# Patient Record
Sex: FEMALE | Race: WHITE | URBAN - METROPOLITAN AREA
[De-identification: names, ages, dates, MRNs, and addresses within clinical notes are randomized per-mention and may not be internally consistent; named-entity substitution may affect disease eponyms.]

---

## 2017-05-12 ENCOUNTER — AMBULATORY - HEALTHEAST (OUTPATIENT)
Dept: CARDIAC REHAB | Facility: HOSPITAL | Age: 77
End: 2017-05-12

## 2017-05-12 DIAGNOSIS — Z95.5 STENTED CORONARY ARTERY: ICD-10-CM

## 2017-06-20 ENCOUNTER — AMBULATORY - HEALTHEAST (OUTPATIENT)
Dept: CARDIAC REHAB | Facility: HOSPITAL | Age: 77
End: 2017-06-20

## 2017-06-20 DIAGNOSIS — Z95.5 STENTED CORONARY ARTERY: ICD-10-CM

## 2017-06-23 ENCOUNTER — AMBULATORY - HEALTHEAST (OUTPATIENT)
Dept: CARDIAC REHAB | Facility: HOSPITAL | Age: 77
End: 2017-06-23

## 2017-06-23 DIAGNOSIS — Z95.5 STENTED CORONARY ARTERY: ICD-10-CM

## 2017-06-26 ENCOUNTER — AMBULATORY - HEALTHEAST (OUTPATIENT)
Dept: CARDIAC REHAB | Facility: HOSPITAL | Age: 77
End: 2017-06-26

## 2017-06-26 DIAGNOSIS — Z95.5 STENTED CORONARY ARTERY: ICD-10-CM

## 2017-07-03 ENCOUNTER — AMBULATORY - HEALTHEAST (OUTPATIENT)
Dept: CARDIAC REHAB | Facility: HOSPITAL | Age: 77
End: 2017-07-03

## 2017-07-03 DIAGNOSIS — Z95.5 STENTED CORONARY ARTERY: ICD-10-CM

## 2017-07-07 ENCOUNTER — AMBULATORY - HEALTHEAST (OUTPATIENT)
Dept: CARDIAC REHAB | Facility: HOSPITAL | Age: 77
End: 2017-07-07

## 2017-07-07 DIAGNOSIS — Z95.5 STENTED CORONARY ARTERY: ICD-10-CM

## 2017-07-10 ENCOUNTER — AMBULATORY - HEALTHEAST (OUTPATIENT)
Dept: CARDIAC REHAB | Facility: HOSPITAL | Age: 77
End: 2017-07-10

## 2017-07-10 DIAGNOSIS — Z95.5 STENTED CORONARY ARTERY: ICD-10-CM

## 2017-07-12 ENCOUNTER — AMBULATORY - HEALTHEAST (OUTPATIENT)
Dept: CARDIAC REHAB | Facility: HOSPITAL | Age: 77
End: 2017-07-12

## 2017-07-12 DIAGNOSIS — Z95.5 STENTED CORONARY ARTERY: ICD-10-CM

## 2017-07-21 ENCOUNTER — AMBULATORY - HEALTHEAST (OUTPATIENT)
Dept: CARDIAC REHAB | Facility: HOSPITAL | Age: 77
End: 2017-07-21

## 2017-07-21 DIAGNOSIS — Z95.5 STENTED CORONARY ARTERY: ICD-10-CM

## 2017-07-24 ENCOUNTER — AMBULATORY - HEALTHEAST (OUTPATIENT)
Dept: CARDIAC REHAB | Facility: HOSPITAL | Age: 77
End: 2017-07-24

## 2017-07-24 DIAGNOSIS — Z95.5 STENTED CORONARY ARTERY: ICD-10-CM

## 2017-07-31 ENCOUNTER — AMBULATORY - HEALTHEAST (OUTPATIENT)
Dept: CARDIAC REHAB | Facility: HOSPITAL | Age: 77
End: 2017-07-31

## 2017-07-31 DIAGNOSIS — Z95.5 STENTED CORONARY ARTERY: ICD-10-CM

## 2017-08-07 ENCOUNTER — AMBULATORY - HEALTHEAST (OUTPATIENT)
Dept: CARDIAC REHAB | Facility: HOSPITAL | Age: 77
End: 2017-08-07

## 2017-08-07 DIAGNOSIS — Z95.5 STENTED CORONARY ARTERY: ICD-10-CM

## 2017-12-07 ENCOUNTER — DOCUMENTATION ONLY (OUTPATIENT)
Dept: OTHER | Facility: CLINIC | Age: 77
End: 2017-12-07

## 2017-12-07 PROBLEM — Z71.89 ACP (ADVANCE CARE PLANNING): Chronic | Status: ACTIVE | Noted: 2017-12-07

## 2018-06-06 ENCOUNTER — AMBULATORY - HEALTHEAST (OUTPATIENT)
Dept: CARDIAC REHAB | Facility: HOSPITAL | Age: 78
End: 2018-06-06

## 2018-06-06 DIAGNOSIS — Z95.5 STENTED CORONARY ARTERY: ICD-10-CM

## 2018-06-19 ENCOUNTER — AMBULATORY - HEALTHEAST (OUTPATIENT)
Dept: CARDIAC REHAB | Facility: HOSPITAL | Age: 78
End: 2018-06-19

## 2018-06-19 DIAGNOSIS — I20.89 CHRONIC STABLE ANGINA (H): ICD-10-CM

## 2018-06-19 DIAGNOSIS — Z95.5 STENTED CORONARY ARTERY: ICD-10-CM

## 2018-06-19 ASSESSMENT — MIFFLIN-ST. JEOR: SCORE: 1263.33

## 2018-06-25 ENCOUNTER — AMBULATORY - HEALTHEAST (OUTPATIENT)
Dept: CARDIAC REHAB | Facility: HOSPITAL | Age: 78
End: 2018-06-25

## 2018-06-25 DIAGNOSIS — I20.89 CHRONIC STABLE ANGINA (H): ICD-10-CM

## 2018-06-27 ENCOUNTER — AMBULATORY - HEALTHEAST (OUTPATIENT)
Dept: CARDIAC REHAB | Facility: HOSPITAL | Age: 78
End: 2018-06-27

## 2018-06-27 DIAGNOSIS — I20.89 CHRONIC STABLE ANGINA (H): ICD-10-CM

## 2018-07-30 ENCOUNTER — AMBULATORY - HEALTHEAST (OUTPATIENT)
Dept: CARDIAC REHAB | Facility: HOSPITAL | Age: 78
End: 2018-07-30

## 2018-07-30 DIAGNOSIS — I20.89 CHRONIC STABLE ANGINA (H): ICD-10-CM

## 2018-08-01 ENCOUNTER — AMBULATORY - HEALTHEAST (OUTPATIENT)
Dept: CARDIAC REHAB | Facility: HOSPITAL | Age: 78
End: 2018-08-01

## 2018-08-01 DIAGNOSIS — I20.89 CHRONIC STABLE ANGINA (H): ICD-10-CM

## 2018-08-06 ENCOUNTER — AMBULATORY - HEALTHEAST (OUTPATIENT)
Dept: CARDIAC REHAB | Facility: HOSPITAL | Age: 78
End: 2018-08-06

## 2018-08-06 DIAGNOSIS — I20.89 CHRONIC STABLE ANGINA (H): ICD-10-CM

## 2018-08-08 ENCOUNTER — AMBULATORY - HEALTHEAST (OUTPATIENT)
Dept: CARDIAC REHAB | Facility: HOSPITAL | Age: 78
End: 2018-08-08

## 2018-08-08 DIAGNOSIS — Z95.5 STENTED CORONARY ARTERY: ICD-10-CM

## 2018-08-13 ENCOUNTER — AMBULATORY - HEALTHEAST (OUTPATIENT)
Dept: CARDIAC REHAB | Facility: HOSPITAL | Age: 78
End: 2018-08-13

## 2018-08-13 DIAGNOSIS — I20.89 CHRONIC STABLE ANGINA (H): ICD-10-CM

## 2018-08-15 ENCOUNTER — AMBULATORY - HEALTHEAST (OUTPATIENT)
Dept: CARDIAC REHAB | Facility: HOSPITAL | Age: 78
End: 2018-08-15

## 2018-08-15 DIAGNOSIS — Z95.5 STENTED CORONARY ARTERY: ICD-10-CM

## 2018-08-20 ENCOUNTER — AMBULATORY - HEALTHEAST (OUTPATIENT)
Dept: CARDIAC REHAB | Facility: HOSPITAL | Age: 78
End: 2018-08-20

## 2018-08-20 DIAGNOSIS — I20.89 CHRONIC STABLE ANGINA (H): ICD-10-CM

## 2018-08-22 ENCOUNTER — AMBULATORY - HEALTHEAST (OUTPATIENT)
Dept: CARDIAC REHAB | Facility: HOSPITAL | Age: 78
End: 2018-08-22

## 2018-08-22 DIAGNOSIS — I20.89 CHRONIC STABLE ANGINA (H): ICD-10-CM

## 2018-09-10 ENCOUNTER — AMBULATORY - HEALTHEAST (OUTPATIENT)
Dept: CARDIAC REHAB | Facility: HOSPITAL | Age: 78
End: 2018-09-10

## 2018-09-10 DIAGNOSIS — I20.89 CHRONIC STABLE ANGINA (H): ICD-10-CM

## 2018-09-12 ENCOUNTER — AMBULATORY - HEALTHEAST (OUTPATIENT)
Dept: CARDIAC REHAB | Facility: HOSPITAL | Age: 78
End: 2018-09-12

## 2018-09-12 DIAGNOSIS — I20.89 CHRONIC STABLE ANGINA (H): ICD-10-CM

## 2018-09-17 ENCOUNTER — AMBULATORY - HEALTHEAST (OUTPATIENT)
Dept: CARDIAC REHAB | Facility: HOSPITAL | Age: 78
End: 2018-09-17

## 2018-09-17 DIAGNOSIS — I20.89 CHRONIC STABLE ANGINA (H): ICD-10-CM

## 2020-03-02 ENCOUNTER — HEALTH MAINTENANCE LETTER (OUTPATIENT)
Age: 80
End: 2020-03-02

## 2020-12-14 ENCOUNTER — HEALTH MAINTENANCE LETTER (OUTPATIENT)
Age: 80
End: 2020-12-14

## 2021-04-18 ENCOUNTER — HEALTH MAINTENANCE LETTER (OUTPATIENT)
Age: 81
End: 2021-04-18

## 2021-06-01 VITALS — BODY MASS INDEX: 33.84 KG/M2 | WEIGHT: 185 LBS

## 2021-06-01 VITALS — WEIGHT: 176 LBS | BODY MASS INDEX: 32.19 KG/M2

## 2021-06-01 VITALS — WEIGHT: 183 LBS | BODY MASS INDEX: 33.47 KG/M2

## 2021-06-01 VITALS — WEIGHT: 182 LBS | BODY MASS INDEX: 33.29 KG/M2

## 2021-06-01 VITALS — BODY MASS INDEX: 33.29 KG/M2 | WEIGHT: 182 LBS

## 2021-06-01 VITALS — WEIGHT: 183 LBS | HEIGHT: 62 IN | BODY MASS INDEX: 33.68 KG/M2

## 2021-06-01 VITALS — BODY MASS INDEX: 33.65 KG/M2 | WEIGHT: 184 LBS

## 2021-06-01 VITALS — BODY MASS INDEX: 33.11 KG/M2 | WEIGHT: 181 LBS

## 2021-06-01 VITALS — WEIGHT: 184 LBS | BODY MASS INDEX: 33.65 KG/M2

## 2021-06-01 VITALS — BODY MASS INDEX: 33.47 KG/M2 | WEIGHT: 183 LBS

## 2021-06-02 VITALS — WEIGHT: 181 LBS | BODY MASS INDEX: 33.11 KG/M2

## 2021-06-02 VITALS — BODY MASS INDEX: 32.92 KG/M2 | WEIGHT: 180 LBS

## 2021-06-11 NOTE — PROGRESS NOTES
"ITP ASSESSMENT   Assessment Day: Initial  Session Number: 1  Precautions: Standard/Falls  Diagnosis: Stent  Risk Stratification: High-BMI  Referring Provider: Reid Crawley MD  EXERCISE  Exercise Assessment: Initial   On her first day of Cardiac Rehab she tolerated 20 min on the Nustep without sx's. Met level 2.1                       Exercise Plan  Goals Next 30 days  ADL'S: Resume grocery shopping/ Laundry  Leisure: Start a home walking program/ Resume sewing    Education Goals: Patient can state cardiac s/s and appropriate emergency response.;Has system for taking medication.;Medication review  Education Goals Met: Patient can state cardiac s/s and appropriate emergency response.    Exercise Prescription  Exercise Mode: Treadmill;Bike;Nustep;Arm Erg.  Frequency: 2 x a week  Duration: 30-40 min  Intensity / THR: 20-30 beats above resting heart rate  RPE 11-14  Progression / Met level: 2.5-3  Resistive Training?: Yes    Current Exercise (mins/week): 5    Interventions  Home Exercise:  Mode: Walking  Frequency: 4-5 x a week  Duration: 10-15 min    Education Material : Educational videos;Provide written material;Individual education and counseling;Offer educational classes    Education Completed  Exercise Education Completed: Cardiac Anatomy;Signs and Symptoms;RPE;Emergency Plan;Home Exercise;Warm up/cool down;FITT Principles;BP/HR Reponse to exercise;Benefits of Exercise;End point of exercise            Exercise Follow-up/Discharge  Follow up/Discharge: Instructed on home walking program NUTRITION  Nutrition Assessment: Initial    Nutrition Risk Factors:  Nutrition Risk Factors: Dyslipidemia;Overweight  Cholesterol: 181 (Done on 8/2015)  LDL: 101 (Done on 8/2015)  HDL: 38 (Done on 8/2015)  Triglycerides: 209 (Done on 8/2015-- \"I need to have them rechecked\")    Nutrition Plan  Interventions  Nutrition Interventions: Diet consult;Diet class;Therapist/Patient discussion;Educational videos;Provide with written " "material    Education Completed  Nutrition Education Completed: Risk factor overview    Goals  Nutrition Goals (Next 30 days): Provide Rate your Plate Survey;Review Dietitian schedule;Patient will follow a low saturated fat diet;Patient knows appropriate portion size;Patient will lose weight    Goals Met  Nutrition Goals Met: Patient can identify their risk factors for CAD;Completed Nutritional Risk Screen;Provided Rate your Plate Survey;Reviewed Dietitian schedule    Height, Weight, and  BMI  Weight: 192 lb (87.1 kg)  Height: 5' 2\" (1.575 m)  BMI: 35.11    Nutrition Follow-up  Follow-up/Discharge: Enc to return diet survey and consult with the dietitian        Other Risk Factors  Other Risk Factor Assessment: Initial    HTN Risk Factor: Hypertension    Pre Exercise BP: 149/72  Post Exercise BP: 138/64    Hypertension Plan  Goals  HTN Goals: Follow low sodium diet;Take medication as prescribed;Exercises regularly    Goals Met  HTN Goals Met: Take medication as prescribed    HTN Interventions  HTN Interventions: Diet consult;Therapist/patient discussion;Provide written material;Offer educational videos;Offer educational classes    HTN Education Completed  HTN Education Completed: Risk factor overview    Tobacco Risk Factor: NA    Risk Factor Follow-up   Follow-up/Discharge: Offer support and education for risk factor management   PSYCHOSOCIAL  Psychosocial Assessment: Initial     Psychosocial Risk Factor: Stress (Stress/ Depression)    Psychosocial Plan  Interventions    Interventions: Offer Social Service consult;Offer Spiritual Care consult;Offer educational videos and classes;Provide written material;Individual education and counseling    Education Completed  Education Completed: Relaxation/Coping Techniques;S/S of depression;Effects of stress on body    Goals  Goals (Next 30 days): Identify stressors;Practicing stress management skills    Goals Met  Goals Met: Oriented to stress management classes;Identified " "Support system    Psychosocial Follow-up  Follow-up/Discharge: Pt reports stress from depression--\"I am working on that\"  \"I am on meds and working with my MD\"         Patient involved in Goal setting?: Yes    Signature: _____________________________________________________________    Date: __________________    Time: __________________See Doc Flowsheet  "

## 2021-06-11 NOTE — PROGRESS NOTES
"Cardiac Rehab  Phase II Assessment    Assessment Date: 6-20-17    Diagnosis: CAD    Procedure: PCI with DAVID x 3 to LAD and LCx x 1  Date of Onset: 5-1-17  ICD/Pacemaker: No   Post-procedure Complications: none  ECG History: SR EF%:66%  Past Medical History: Depression, Incont of urine, JUAN JOSE with CPAP, GI reflex, Hyperlipidemia, Fibromyalgia, HTN, Spinal stenosis, Mitral regurgitation, Sjogren's syndrome, Peripheral neuropathy, PAD, Diplopia, MSSA, DJD, Graves disease, Hx of PE, Stents 15 yrs ago    Physical Assessment  Precautions/ Physical Limitations: \"Right leg unsteady from spinal stenosis\"  Chronic back pain  Oxygen: No  O2 Sats: 95-98% Lung Sounds: Clear Edema: none  Incisions: na  Sleeping Pattern: good  with CPAP   Appetite: fair   Nutrition Risk Screen: no risk at this time    Pain  Location: none  Intensity: (0-10 scale) 0  Current Pain Management: Tramadol for back pain  Response: good relief    Psychosocial/ Emotional Health  1. In the past 12 months, have you been in a relationship where you have been abused physically, emotionally, sexually or financially? No  notified: NA  2. Who do you turn to for emotional support?: Daughter  3. Do you have cultural or spiritual needs? No  4. Have there been any major life changes in the past 12 months? No    Referral Information  Primary Physician: Dr Castañeda  Cardiologist: Dr Crawley    Home exercise/Equipment: Exercise room in her senior apt    Patient's long-term goal(s): Improved strength    1. Living Accommodations: Apartment--Senior Steps: Yes      Support people at home: Lives alone   2. Marital Status:   3. Family is able to assist with cares      Rastafarian/Community involvement: none at this time  4. Recreation/Hobbies: Sewing/ Crafts        See Doc Flowsheet  "

## 2021-06-12 NOTE — PROGRESS NOTES
ITP ASSESSMENT   Assessment Day: 30 Day  Session Number: 8  Diagnosis: Stent  Risk Stratification: High  Referring Provider: Kaitlin Mathews CNP  EXERCISE  Exercise Assessment: Reassessment                            Exercise Plan  Goals Next 30 days  ADL'S: Independent Lives in Senior housing independent  Leisure: cross stitch, computer  Work: Retired, but watches grandchildren  ages 6, 8, & 9.    Education Goals: Patient can state cardiac s/s and appropriate emergency response.;Has system for taking medication.;Medication review;All goals in this section met  Education Goals Met: Patient can state cardiac s/s and appropriate emergency response.;Has system for taking medication.;Medication review.                        Goals Met  Initial ADL's goals met: Grocery shopping & Laundry done  Initial Leisure goals met: Sewing resumed, home walking not resumed  Intial Work goals met: Retired & in Independent Senior Housing  Initial Progression: All Goal in this section met    Exercise Prescription  Exercise Mode: Nustep  Frequency: 2 x week  Duration: 40 minutes  Intensity / THR: 20-30 beats above resting heart rate  RPE 11-14  Progression / Met level: 3-3.5  Resistive Training?: Yes    Current Exercise (mins/week): 150    Interventions  Home Exercise:  Mode: Walking  Frequency: Daily  Duration: 30-40 minutes    Education Material : Individual education and counseling    Education Completed  Exercise Education Completed: Home Exercise;Warm up/cool down;Medication review;RPE;Benefits of Exercise;Signs and Symptoms            Exercise Follow-up/Discharge  Follow up/Discharge: Wants to start walking with a group from her senior housing that walk daily         NUTRITION  Nutrition Assessment: Reassessment    Nutrition Risk Factors:  Nutrition Risk Factors: Dyslipidemia  Cholesterol: 181  LDL: 101  HDL: 38  Triglycerides: 209    Nutrition Plan  Interventions  Nutrition Interventions: Diet consult  Rate Your Plate Score:  "58    Education Completed  Nutrition Education Completed: Low Saturated fat diet    Goals  Nutrition Goals (Next 30 days): Patient will follow a low sodium diet;Patient will follow a low saturated fat diet    Goals Met  Nutrition Goals Met: Patient can identify their risk factors for CAD;Patient follows a low sodium diet;Provided Rate your Plate Survey;Patient states following a low saturated fat diet    Height, Weight, and  BMI  Weight: 194 lb (88 kg)    Nutrition Follow-up  Follow-up/Discharge: 9/13/17     Other Risk Factors  Other Risk Factor Assessment: Reassessment    HTN Risk Factor: Hypertension    Pre Exercise BP: 116/70  Post Exercise BP: 148/70    Hypertension Plan  Goals  HTN Goals: Exercises regularly;Take medication as prescribed    Goals Met  HTN Goals Met: Take medication as prescribed    HTN Interventions  HTN Interventions: Therapist/patient discussion;Diet consult;Provide written material    HTN Education Completed  HTN Education Completed: Medication review    Tobacco Risk Factor: NA            Risk Factor Follow-up   Follow-up/Discharge: Given resources for risk factor management       PSYCHOSOCIAL  Psychosocial Assessment: Reassessment     No Data Recorded    No Data Recorded    No Data Recorded    Psychosocial Plan  Interventions    Goals Met  Goals Met: Oriented to stress management classes;Identified Support system    Psychosocial Follow-up  Follow-up/Discharge: Pt reports stress from depression--\"I am working on that\"         Patient involved in Goal setting?: Yes  Pt reports chest discomfort with exertion relieved with rest since stents placed, will call Dr. Crawley with concerns. Dr. Castañeda has ordered a stress test.   Plans to join walking group at [employer].  Signature: _____________________________________________________________    Date: __________________    Time: __________________See Doc Flowsheet  "

## 2021-06-13 NOTE — PROGRESS NOTES
Cardiac Rehab Discharge Note (9/18/17):     Pt no call/no show x 3 consecutive appointments. Pt was called to follow up, stated she is going to have more stents on 8/29/17 and will call back to schedule. Pt was informed we will need a resume order to reschedule, pt stated understanding. Chart held x 1 month, no return call received. Pt will be discharged from program per cancellation policy signed by pt on initial visit.

## 2021-06-18 NOTE — PROGRESS NOTES
DR DURON SENT RX Up   INFORMED RAMIREZ READY TO P/U-MW ITP ASSESSMENT   Assessment Day: Initial  Session Number: 1  Precautions: Falls Precautions  Diagnosis: Stable Angina  Risk Stratification: High  Referring Provider: Suzy Castañeda MD  EXERCISE  Exercise Assessment: Initial    Tolerated 25' of exercise at 1.9 mets                         Exercise Plan  Goals Next 30 days  ADL'S: Light House cleaning without fatigue  Leisure: Use Nustep in exercise Room; 3-5  days per week;  Socialize with friends  Work: Assist with care of grand kids (ages 9 & 10)      Education Goals: Patient can state cardiac s/s and appropriate emergency response.;Has system for taking medication.;Medication review  Education Goals Met: Has system for taking medication.    Exercise Prescription  Exercise Mode: Nustep;Arm Erg.  Frequency: 2 x week  Duration: 40'  Intensity / THR: 20-30 beats above resting heart rate  RPE 11-14  Progression / Met level: 1.9-2.4  Resistive Training?: Yes (Will review with pt in the future)    Current Exercise (mins/week): 1    Interventions  Home Exercise:  Mode: Nustep  Frequency: 3-5 days per week  Duration: 20-30'    Education Material : Educational videos;Provide written material;Individual education and counseling;Offer educational classes    Education Completed  Exercise Education Completed: Signs and Symptoms;RPE;Emergency Plan;Home Exercise;Warm up/cool down;FITT Principles;BP/HR Reponse to exercise;Benefits of Exercise;End point of exercise            Exercise Follow-up/Discharge  Follow up/Discharge: Encouraged pt use Nustep in exercise room at her apartment NUTRITION  Nutrition Assessment: Initial    Nutrition Risk Factors:  Nutrition Risk Factors: Dyslipidemia;Diabetes;Overweight  HbA1c: 6.9  Monitors blood sugar at home: No  Frequency: 0  Cholesterol: 152  LDL: 84  HDL: 34  Triglycerides: 168    Nutrition Plan  Interventions  No Data Recorded  No Data Recorded  No Data Recorded    Education Completed  Nutrition Education Completed: Risk factor  "overview    Goals  Nutrition Goals (Next 30 days): Patient can identify their risk factors for CAD;Provide Rate your Plate Survey;Review Dietitian schedule    Goals Met  Nutrition Goals Met: Patient can identify their risk factors for CAD;Provided Rate your Plate Survey;Reviewed Dietitian schedule    Height, Weight, and  BMI  Weight: 183 lb (83 kg)  Height: 5' 2\" (1.575 m)  BMI: 33.46    Nutrition Follow-up  Follow-up/Discharge: Encouraged pt to complete Diet Habit Survey       Other Risk Factors  Other Risk Factor Assessment: Initial    HTN Risk Factor: Hypertension    Pre Exercise BP: 131/78  Post Exercise BP: 128/60    Hypertension Plan  Goals  HTN Goals: Follow low sodium diet;Take medication as prescribed;Exercises regularly    Goals Met  HTN Goals Met: Take medication as prescribed    HTN Interventions  HTN Interventions: Diet consult;Therapist/patient discussion;Provide written material;Offer educational videos;Offer educational classes    HTN Education Completed  HTN Education Completed: Risk factor overview    Tobacco Risk Factor: NA      Risk Factor Follow-up   Follow-up/Discharge: Will provide risk factor education as needed   PSYCHOSOCIAL  Psychosocial Assessment: Initial     [facility] Q of L Summary Score: 27    LUIS-D Score: 7    Psychosocial Risk Factor: Stress    Psychosocial Plan  Interventions    Interventions: Offer educational videos and classes;Provide written material;Individual education and counseling    Education Completed  Education Completed: Effects of stress on body    Goals  Goals (Next 30 days): Practicing stress management skills    Goals Met  Goals Met: Identified Support system;Oriented to stress management classes;Identify stressors    Psychosocial Follow-up  Follow-up/Discharge: Reports she has a good friend who is very supportive           Patient involved in Goal setting?: Yes    Signature: _____________________________________________________________    Date: " __________________    Time: __________________

## 2021-06-18 NOTE — PROGRESS NOTES
Cardiac Rehab  Phase II Assessment    Assessment Date: 6/19/18    Diagnosis: Chronic Stable Angina  Date of Onset: 4/26/18  Procedure: Angios--No Intervention  Date of Onset: 4/27/18  ICD/Pacemaker: No   Post-op Complications:   ECG History: NSR EF%:60%  Past Medical History:   Past Medical History:   Diagnosis Date     CAD (coronary artery disease)      Depression      Heart attack     2     HTN (hypertension)      Urinary retention 05/13/2017   ;  Chronic Stable Angina-Worsened by iron Deficiency Anemia; Chronic Obsrtucted D1; Patent Stents LAD; Left CX;   D-1  RLL opacicity; Microcytic Anemia; PE; JUAN JOSE- on Cpap;  DM-type 2; HTN;  Hyperlipidemia; Depression;  Arthritis; Fibromyalgia;    Spinal Stenosis; Crohn's Disease; Endometriosis; Graves Disease; Hx TKA;  Sjogren's Disease;  Spinal  Stenosis      Physical Assessment  Precautions/ Physical Limitations: Falls Precautions; Back Pain  Oxygen: No  O2 Sats: 95%  Lung Sounds: Clear Edema:   Incisions:   Sleeping Pattern: fair   Appetite: fair   Nutrition Risk Screen: Weight loss    Pain  Location: Low Back Pain  8/10  Characteristics:  Intensity: (0-10 scale) 8  Current Pain Management: Gabapentin  Intervention:   Response:     Psychosocial/ Emotional Health  1. In the past 12 months, have you been in a relationship where you have been abused physically, emotionally, sexually or financially? No  notified: NA  2. Who do you turn to for emotional support?: Friend and Daughter  3. Do you have cultural or spiritual needs? No  4. Have there been any major life changes in the past 12 months? No    Referral Information  Primary Physician: Dr. Suzy Castañeda  Cardiologist: Dr. Crawley  Surgeon:     Home exercise/Equipment: Exercise Room, with Nusteps, bikes and Treadmills    Patient's long-term goal(s): Regular home exercise program;      1. Living Accommodations: Apartment Steps: Yes/  Elevator available      Support people at home: no; Daughter and 2 grand  kids in the area   2. Marital Status:   3. Family is able to assist with cares      Evangelical/Community involvement: Yes  4. Recreation/Hobbies: Help with Grand Kids; Go out to dinner; Play cards;        See Doc Flowsheet

## 2021-06-19 NOTE — PROGRESS NOTES
ITP ASSESSMENT   Assessment Day: 30 Day  Session Number: 4  Precautions: Falls  Diagnosis: Stable Angina  Risk Stratification: High  Referring Provider: Suzy Castañeda  EXERCISE  Exercise Assessment: Reassessment   Pt returned to Cardiac Rehab after 1 month due to eye issues. She is exercising on the Nustep for 40 minutes at MET level 2.1. Denies cardiac symptoms.                         Exercise Plan  Goals Next 30 days  ADL'S: Organize apartment  Leisure: Walk more steadily  Work: Travel to [place] to visit family    Education Goals: Patient can state cardiac s/s and appropriate emergency response.;Has system for taking medication.;Medication review  Education Goals Met: Has system for taking medication.                        Goals Met  Initial ADL's goals met: Doing light housecleaning-goal met  Initial Leisure goals met: Using the NuStep-Goal met  Intial Work goals met: Is caring for grandkids-goal met  Initial Progression: All goals in section met    Exercise Prescription  Exercise Mode: Nustep;Arm Erg.  Frequency: 2x/week  Duration: 40'  Intensity / THR: 20-30 beats above resting heart rate  RPE 11-14  Progression / Met level: 2.1-2.4  Resistive Training?: Yes (Will review)    Current Exercise (mins/week): 0    Interventions  Home Exercise:  Mode: Nustep  Frequency: 3-5 days/week  Duration: 30-40'    Education Material : Educational videos;Provide written material;Individual education and counseling;Offer educational classes    Education Completed  Exercise Education Completed: Signs and Symptoms;RPE;Emergency Plan;Home Exercise;Warm up/cool down;FITT Principles;BP/HR Reponse to exercise;Benefits of Exercise;End point of exercise            Exercise Follow-up/Discharge  Follow up/Discharge: Encouraged patient to continue to work toward goals     NUTRITION  Nutrition Assessment: Reassessment    Nutrition Risk Factors:  Nutrition Risk Factors: Dyslipidemia;Diabetes;Overweight  HbA1c: 6.9  Monitors blood sugar at  "home: No  Frequency: 0  Cholesterol: 152  LDL: 84  HDL: 34  Triglycerides: 168      Education Completed  Nutrition Education Completed: Risk factor overview    Goals  Nutrition Goals (Next 30 days): Patient can identify their risk factors for CAD;Provide Rate your Plate Survey;Review Dietitian schedule    Goals Met  Nutrition Goals Met: Patient can identify their risk factors for CAD;Provided Rate your Plate Survey;Reviewed Dietitian schedule    Height, Weight, and  BMI  Weight: 184 lb (83.5 kg)  Height: 5' 2\" (1.575 m)  BMI: 33.65    Nutrition Follow-up  Follow-up/Discharge: Encouraged pt to complete Diet Habit Survey     Other Risk Factors  Other Risk Factor Assessment: Reassessment    HTN Risk Factor: Hypertension    Pre Exercise BP: 124/58  Post Exercise BP: 120/60    Hypertension Plan  Goals  HTN Goals: Follow low sodium diet;Take medication as prescribed;Exercises regularly    Goals Met  HTN Goals Met: Take medication as prescribed    HTN Interventions  HTN Interventions: Diet consult;Therapist/patient discussion;Provide written material;Offer educational videos;Offer educational classes    HTN Education Completed  HTN Education Completed: Risk factor overview    Tobacco Risk Factor: NA    Risk Factor Follow-up   Follow-up/Discharge: Continue to provide risk factor education       PSYCHOSOCIAL    Psychosocial Risk Factor: Stress    Psychosocial Plan  Interventions  Interventions: Offer educational videos and classes;Provide written material;Individual education and counseling    Education Completed  Education Completed: Effects of stress on body    Goals  Goals (Next 30 days): Practicing stress management skills    Goals Met  Goals Met: Identified Support system;Oriented to stress management classes;Identify stressors    Psychosocial Follow-up  Follow-up/Discharge: Reports that her stress level is ok         Patient involved in Goal setting?: Yes    Signature: " _____________________________________________________________    Date: __________________    Time: __________________See Doc Flowsheet

## 2021-06-20 NOTE — PROGRESS NOTES
ITP ASSESSMENT   Assessment Day: 60 Day  Session Number: 12  Precautions: Falls  Diagnosis: Stable Angina  Risk Stratification: High  Referring Provider: Kaitlin Mathwes CNP  EXERCISE  Exercise Assessment: Reassessment     Tolerates 40 min at 2.5 METS with no CV sx's.                       Exercise Plan  Goals Next 30 days  ADL'S: De-clutter office area  Leisure: Clean off kitchen table  Work: Take a driving course    Education Goals: All goals in this section met  Education Goals Met: Patient can state cardiac s/s and appropriate emergency response.;Has system for taking medication.;Medication review.                        Goals Met  30 day ADL'S goals met: Organize apt. - progress toward goal  30 day Leisure goals met: Walk more steadily - not met  30 day Work goals met: Travel to [place] - met  30 Day Progression: Reports fatigue continues to limit her with daily activities    Initial ADL's goals met: Doing light housecleaning-goal met  Initial Leisure goals met: Using the [employer]-Goal met  Intial Work goals met: Is caring for kathy-goal met  Initial Progression: All goals in section met    Exercise Prescription  Exercise Mode: Nustep;Arm Erg.;Hallway Walking  Frequency: 2/week  Duration: 40 min  Intensity / THR: 20-30 beats above resting heart rate  RPE 11-14  Progression / Met level: 2.3-2.6  Resistive Training?: Yes    Current Exercise (mins/week): 120    Interventions  Home Exercise:  Mode: Nustep  Frequency:  days/week  Duration: 30-40 min    Education Material : Educational videos;Provide written material;Individual education and counseling;Offer educational classes    Education Completed  Exercise Education Completed: Signs and Symptoms;RPE;Emergency Plan;Home Exercise;Warm up/cool down;FITT Principles;BP/HR Reponse to exercise;Benefits of Exercise;End point of exercise            Exercise Follow-up/Discharge  Follow up/Discharge: Needs encouragement to do home exercise NUTRITION  Nutrition Assessment:  "Reassessment    Nutrition Risk Factors:  Nutrition Risk Factors: Dyslipidemia;Diabetes;Overweight  Monitors blood sugar at home: No    Nutrition Plan  Interventions    Education Completed  Nutrition Education Completed: Risk factor overview    Goals  Nutrition Goals (Next 30 days): Review Dietitian schedule;Patient will lose weight;Patient will follow a low saturated fat diet;Patient will follow a low sodium diet    Goals Met  Nutrition Goals Met: Patient can identify their risk factors for CAD;Provided Rate your Plate Survey;Reviewed Dietitian schedule;Patient knows appropriate portion size;Patient has lost weight;Patient states following a low saturated fat diet;Patient follows a low sodium diet;Completed Nutritional Risk Screen    Height, Weight, and  BMI  Weight: 181 lb (82.1 kg)  Height: 5' 2\" (1.575 m)  BMI: 33.1    Nutrition Follow-up  Follow-up/Discharge: Diet consult planned for this month, Weight is down 2 lbs since initial eval       Other Risk Factors  Other Risk Factor Assessment: Reassessment    HTN Risk Factor: Hypertension    Pre Exercise BP: 138/70  Post Exercise BP: 114/68    Hypertension Plan  Goals  HTN Goals: Follow low sodium diet;Exercises regularly    Goals Met  HTN Goals Met: Take medication as prescribed;Follow low sodium diet    HTN Interventions  HTN Interventions: Diet consult;Therapist/patient discussion;Provide written material;Offer educational videos;Offer educational classes    HTN Education Completed  HTN Education Completed: Medication review;Risk factor overview    Tobacco Risk Factor: NA     Risk Factor Follow-up   Follow-up/Discharge: BP's are slightly elevated, Diet consult planned this month   PSYCHOSOCIAL  Psychosocial Assessment: Reassessment     Psychosocial Risk Factor: Stress    Psychosocial Plan  Interventions  Interventions: Offer educational videos and classes;Provide written material;Individual education and counseling    Education Completed  Education Completed: " Effects of stress on body;Relaxation/Coping Techniques;S/S of depression    Goals  Goals (Next 30 days): Practicing stress management skills    Goals Met  Goals Met: Practicing stress management skills;Identified Support system;Identify stressors;Oriented to stress management classes    Psychosocial Follow-up  Follow-up/Discharge: Continues to work on managing stress levels, classes offered           Patient involved in Goal setting?: Yes    Signature: _____________________________________________________________    Date: __________________    Time: __________________See Doc Flowsheet

## 2021-06-20 NOTE — PROGRESS NOTES
Nuvia Lewis has participated in 12 sessions of Phase II Cardiac Rehab.    Progress Report:   Cardiac Rehab Treatment Progress Report 8/13/2018 8/15/2018 8/20/2018 8/22/2018 9/10/2018   Weight 182 lbs 183 lbs 182 lbs 181 lbs 181 lbs   Pre Exercise  HR 66 63 67 71 65   Pre Exercise /62 110/60 108/54 122/64 138/70   Nustep Peak Heart Rate 75 73 74 76 72   Nustep Peak Blood Pressure 130/64 114/50 128/62 142/64 144/62   Heart Rate 69 68 64 64 66   Post Exercise /60 110/64 108/56 114/68 130/68   ECG SR Sinus Rhythm SR SR SR   Total Exercise Minutes 47 45 35 - 40         Current Status:  Currently exercising without complaints or symptoms.    If Physician recommends change in treatment plan, please place orders.        __________________________________________________      _____________  Signature                                                                                                  DateSee Doc Flowsheet

## 2021-06-21 NOTE — PROGRESS NOTES
Cardiac Rehab Discharge Note - Pt attended 15 sessions of Phase II Cardiac Rehab. Tolerated 40 minutes of exercise at 2.3 mets without any symptoms. Pt was called regarding missed appts, chart was held for 1 month and we will be discharging her from our care today.

## 2021-10-02 ENCOUNTER — HEALTH MAINTENANCE LETTER (OUTPATIENT)
Age: 81
End: 2021-10-02

## 2022-05-14 ENCOUNTER — HEALTH MAINTENANCE LETTER (OUTPATIENT)
Age: 82
End: 2022-05-14

## 2022-09-03 ENCOUNTER — HEALTH MAINTENANCE LETTER (OUTPATIENT)
Age: 82
End: 2022-09-03

## 2023-06-03 ENCOUNTER — HEALTH MAINTENANCE LETTER (OUTPATIENT)
Age: 83
End: 2023-06-03

## 2025-02-05 ENCOUNTER — LAB REQUISITION (OUTPATIENT)
Dept: LAB | Facility: CLINIC | Age: 85
End: 2025-02-05
Payer: MEDICARE

## 2025-02-05 DIAGNOSIS — R30.0 DYSURIA: ICD-10-CM

## 2025-02-05 LAB
ALBUMIN UR-MCNC: NEGATIVE MG/DL
APPEARANCE UR: ABNORMAL
BACTERIA #/AREA URNS HPF: ABNORMAL /HPF
BILIRUB UR QL STRIP: NEGATIVE
COLOR UR AUTO: ABNORMAL
GLUCOSE UR STRIP-MCNC: NEGATIVE MG/DL
HGB UR QL STRIP: NEGATIVE
KETONES UR STRIP-MCNC: NEGATIVE MG/DL
LEUKOCYTE ESTERASE UR QL STRIP: ABNORMAL
NITRATE UR QL: POSITIVE
PH UR STRIP: 6 [PH] (ref 5–7)
RBC URINE: 0 /HPF
SP GR UR STRIP: 1.01 (ref 1–1.03)
SQUAMOUS EPITHELIAL: 1 /HPF
UROBILINOGEN UR STRIP-MCNC: NORMAL MG/DL
WBC URINE: 92 /HPF

## 2025-02-05 PROCEDURE — 81001 URINALYSIS AUTO W/SCOPE: CPT | Mod: ORL | Performed by: FAMILY MEDICINE

## 2025-02-05 PROCEDURE — 87186 SC STD MICRODIL/AGAR DIL: CPT | Mod: ORL | Performed by: FAMILY MEDICINE

## 2025-02-06 LAB
BACTERIA UR CULT: ABNORMAL
BACTERIA UR CULT: ABNORMAL

## 2025-02-07 LAB
BACTERIA UR CULT: ABNORMAL
BACTERIA UR CULT: ABNORMAL